# Patient Record
Sex: MALE | Race: WHITE | Employment: FULL TIME | URBAN - METROPOLITAN AREA
[De-identification: names, ages, dates, MRNs, and addresses within clinical notes are randomized per-mention and may not be internally consistent; named-entity substitution may affect disease eponyms.]

---

## 2024-03-28 ENCOUNTER — OFFICE VISIT (OUTPATIENT)
Age: 39
End: 2024-03-28

## 2024-03-28 VITALS — BODY MASS INDEX: 42.66 KG/M2 | WEIGHT: 315 LBS | HEIGHT: 72 IN

## 2024-03-28 DIAGNOSIS — S43.101A AC SEPARATION, RIGHT, INITIAL ENCOUNTER: Primary | ICD-10-CM

## 2024-03-28 RX ORDER — TRAMADOL HYDROCHLORIDE 50 MG/1
50 TABLET ORAL EVERY 6 HOURS PRN
Qty: 28 TABLET | Refills: 0 | Status: SHIPPED | OUTPATIENT
Start: 2024-03-28 | End: 2024-04-04

## 2024-03-28 NOTE — PROGRESS NOTES
evaluation and plan.    Will start sling and Rx Voltaren 50mg plus Ultram PRN severe as above and plan follow-up 4 weeks, work limits until then.    Total time spent on encounter including chart/imaging/lab review and evaluation/documentation/demo home program/coordination of care/form completion but not including time for any procedures/manipulation 48 minutes.

## 2025-05-20 ENCOUNTER — OFFICE VISIT (OUTPATIENT)
Age: 40
End: 2025-05-20
Payer: COMMERCIAL

## 2025-05-20 VITALS — WEIGHT: 315 LBS | BODY MASS INDEX: 46.79 KG/M2

## 2025-05-20 DIAGNOSIS — M99.07 UPPER EXTREMITY SOMATIC DYSFUNCTION: ICD-10-CM

## 2025-05-20 DIAGNOSIS — M99.03 SOMATIC DYSFUNCTION OF LUMBAR REGION: ICD-10-CM

## 2025-05-20 DIAGNOSIS — M99.06 LOWER LIMB REGION SOMATIC DYSFUNCTION: ICD-10-CM

## 2025-05-20 DIAGNOSIS — M99.05 PELVIC REGION SOMATIC DYSFUNCTION: ICD-10-CM

## 2025-05-20 DIAGNOSIS — M99.09 SOMATIC DYSFUNCTION OF ABDOMINAL REGION: ICD-10-CM

## 2025-05-20 DIAGNOSIS — M99.08 RIB CAGE REGION SOMATIC DYSFUNCTION: ICD-10-CM

## 2025-05-20 DIAGNOSIS — M99.04 SACRAL REGION SOMATIC DYSFUNCTION: ICD-10-CM

## 2025-05-20 DIAGNOSIS — S29.019A THORACIC MYOFASCIAL STRAIN, INITIAL ENCOUNTER: Primary | ICD-10-CM

## 2025-05-20 DIAGNOSIS — M99.02 THORACIC REGION SOMATIC DYSFUNCTION: ICD-10-CM

## 2025-05-20 DIAGNOSIS — M99.01 CERVICAL SOMATIC DYSFUNCTION: ICD-10-CM

## 2025-05-20 PROCEDURE — 98929 OSTEOPATH MANJ 9-10 REGIONS: CPT | Performed by: FAMILY MEDICINE

## 2025-05-20 PROCEDURE — 99214 OFFICE O/P EST MOD 30 MIN: CPT | Performed by: FAMILY MEDICINE

## 2025-05-20 RX ORDER — IBUPROFEN 200 MG
200 TABLET ORAL EVERY 6 HOURS PRN
COMMUNITY

## 2025-05-20 NOTE — PATIENT INSTRUCTIONS
Either scan this QR code on your smartphone:        Or search YouTube for my channel:    Dr. CARMEN Prado    Neck/Upper back    Resisted shoulder blade squeeze    For this exercise, you will need elastic exercise material, such as surgical tubing or Thera-Band.  Sit or stand, holding the band in both hands in front of you. Keep your elbows close to your sides, bent at a 90-degree angle. Your palms should face up.  Squeeze your shoulder blades together, and move your arms to the outside, stretching the band. Be sure to keep your elbows at your sides while you do this.  Relax.  Repeat 8 to 12 times.  Resisted rows    For this exercise, you will need elastic exercise material, such as surgical tubing or Thera-Band.  Put the band around a solid object, such as a bedpost, at about waist level. Hold one end of the band in each hand.  With your elbows at your sides and bent to 90 degrees, pull the band back to move your shoulder blades toward each other. Return to the starting position.  Repeat 8 to 12 times.

## 2025-05-20 NOTE — PROGRESS NOTES
HISTORY OF PRESENT ILLNESS    Moisés Choi 1985 is a 39 y.o. year old male comes in today to be evaluated and treated for: upper back pain    Since last appt 3/28/2024 has noticed pain for about 2 days. Pain level 5/10. Using stretch and some Ibuprofen 800mg  with benefit.    No past surgical history on file.  Social History     Socioeconomic History    Marital status:    Tobacco Use    Smoking status: Never    Smokeless tobacco: Never   Substance and Sexual Activity    Alcohol use: Yes     Alcohol/week: 1.0 standard drink of alcohol     Types: 1 Cans of beer per week    Drug use: Never     Current Outpatient Medications   Medication Sig Dispense Refill    ibuprofen (ADVIL;MOTRIN) 200 MG tablet Take 1 tablet by mouth every 6 hours as needed for Pain      diclofenac (VOLTAREN) 50 MG EC tablet Take 1 tablet by mouth with breakfast and with evening meal As needed pain. 60 tablet 1     No current facility-administered medications for this visit.     No past medical history on file.  No family history on file.      ROS:  No numb, tingle    Objective:  Wt (!) 156.5 kg (345 lb)   BMI 46.79 kg/m²   NEURO:  Sensation intact to light touch.  Reflexes +2/4 biceps, triceps, patellar, and Achilles bilaterally.  M/S:  Examined seated and supine.  Slump negative. Spurling negative. Standing flexion test positive right  Sphinx test positive left.  ASIS low right  Iliac crests equal bilaterally Pubes equal bilaterally Medial malleolus low right  Sacral base posterior left  YAAKOV low right  TTA at C3 on left worse flexion, T3, 4 on left worse flexion, and L4, 5 on left worse flexion  Rib(s) 4, 5, 6 left TTP and posterior  Extremity Strength +5/5 bilaterally Piriformis normal bilaterally. Thoracic diaphragm restricted right. Scapula motion restricted w/ TTA right. Hip flexion limited left.      Assessment/Plan:    Diagnosis Orders   1. Thoracic myofascial strain, initial encounter        2. Somatic dysfunction of lumbar